# Patient Record
Sex: FEMALE | Race: OTHER | HISPANIC OR LATINO | Employment: UNEMPLOYED | URBAN - NONMETROPOLITAN AREA
[De-identification: names, ages, dates, MRNs, and addresses within clinical notes are randomized per-mention and may not be internally consistent; named-entity substitution may affect disease eponyms.]

---

## 2023-07-08 ENCOUNTER — HOSPITAL ENCOUNTER (EMERGENCY)
Facility: HOSPITAL | Age: 43
Discharge: HOME | End: 2023-07-08
Attending: EMERGENCY MEDICINE
Payer: COMMERCIAL

## 2023-07-08 ENCOUNTER — APPOINTMENT (EMERGENCY)
Dept: CT IMAGING | Facility: HOSPITAL | Age: 43
End: 2023-07-08
Payer: COMMERCIAL

## 2023-07-08 VITALS
OXYGEN SATURATION: 98 % | TEMPERATURE: 98.3 F | HEART RATE: 61 BPM | WEIGHT: 253.31 LBS | DIASTOLIC BLOOD PRESSURE: 60 MMHG | RESPIRATION RATE: 18 BRPM | SYSTOLIC BLOOD PRESSURE: 107 MMHG

## 2023-07-08 DIAGNOSIS — S09.90XA INJURY OF HEAD, INITIAL ENCOUNTER: Primary | ICD-10-CM

## 2023-07-08 DIAGNOSIS — S06.0XAA CONCUSSION: ICD-10-CM

## 2023-07-08 PROCEDURE — 70450 CT HEAD/BRAIN W/O DYE: CPT

## 2023-07-08 PROCEDURE — 99284 EMERGENCY DEPT VISIT MOD MDM: CPT

## 2023-07-08 PROCEDURE — 72125 CT NECK SPINE W/O DYE: CPT

## 2023-07-08 RX ORDER — BUPROPION HYDROCHLORIDE 300 MG/1
300 TABLET ORAL DAILY
COMMUNITY
Start: 2023-04-10

## 2023-07-08 RX ORDER — CLONAZEPAM 1 MG/1
1 TABLET ORAL DAILY
COMMUNITY
Start: 2023-05-30

## 2023-07-08 RX ORDER — QUETIAPINE FUMARATE 200 MG/1
250 TABLET, FILM COATED ORAL DAILY
COMMUNITY
Start: 2023-04-03

## 2023-07-08 RX ORDER — CLOPIDOGREL BISULFATE 75 MG/1
75 TABLET ORAL DAILY
COMMUNITY
Start: 2023-04-12

## 2023-07-08 RX ORDER — HYDROXYZINE 50 MG/1
TABLET, FILM COATED ORAL
COMMUNITY

## 2023-07-08 RX ORDER — LEVOTHYROXINE SODIUM 0.1 MG/1
100 TABLET ORAL DAILY
COMMUNITY
Start: 2023-03-21

## 2023-07-08 RX ORDER — ACETAMINOPHEN 325 MG/1
975 TABLET ORAL ONCE
Status: COMPLETED | OUTPATIENT
Start: 2023-07-08 | End: 2023-07-08

## 2023-07-08 RX ORDER — GABAPENTIN 300 MG/1
300 CAPSULE ORAL 3 TIMES DAILY
COMMUNITY
Start: 2023-04-13

## 2023-07-08 RX ORDER — ATORVASTATIN CALCIUM 40 MG/1
40 TABLET, FILM COATED ORAL DAILY
COMMUNITY

## 2023-07-08 RX ORDER — LISINOPRIL 10 MG/1
10 TABLET ORAL DAILY
COMMUNITY
Start: 2023-04-12

## 2023-07-08 RX ADMIN — ACETAMINOPHEN 975 MG: 325 TABLET, FILM COATED ORAL at 03:10

## 2023-07-08 NOTE — ED PROVIDER NOTES
Emergency Department Trauma Note  Josie Hacth 44 y.o. female MRN: [id number]  Unit/Bed#: QY69/QU11 Encounter: [id number]      Trauma Alert: Trauma Acuity: Trauma Evaluation  Model of Arrival:   via    Trauma Team: Current Providers  Attending Provider: Joe Nascimento MD  Registered Nurse: Juana Kelsey RN  Consultants:     None      History of Present Illness     Chief Complaint:   Chief Complaint   Patient presents with   • Head Injury     HPI:  Josie Hatch is a 44 y.o. female who presents with . Mechanism:Details of Incident: Patient reports striking head on bottom side of steps when standing up. Patient is on plavix and reports LOC at the time. Injury Date: 07/06/23        56-year-old female, on Plavix, who presents for head strike. Patient reports that 2 days ago, she hit her head on the bottom of the staircase. She states that she hit the top of her head, and lost consciousness afterwards. She reports since then having several episodes of nausea and vomiting. She reports blurry vision however only in her left eye and intermittent (not present currently). She denies any numbness, weakness, tingling. She does have a history of migraine headaches, states that she has a frontal headache that rates to her neck. Review of systems otherwise negative. Review of Systems   Constitutional: Negative for chills and fever. HENT: Negative for congestion, rhinorrhea and sore throat. Eyes: Positive for visual disturbance. Respiratory: Negative for cough and shortness of breath. Cardiovascular: Negative for chest pain and palpitations. Gastrointestinal: Positive for nausea and vomiting. Negative for abdominal pain, constipation and diarrhea. Genitourinary: Negative for difficulty urinating and flank pain. Musculoskeletal: Negative for arthralgias. Neurological: Positive for headaches. Negative for dizziness, weakness and light-headedness.    Psychiatric/Behavioral: Negative for agitation, behavioral problems and confusion. All other systems reviewed and are negative. Historical Information     Immunizations: There is no immunization history on file for this patient. Past Medical History:   Diagnosis Date   • Diabetes mellitus ([address])    • Disease of thyroid gland    • Hypercholesteremia    • Hypertension    • Stroke [facility])      History reviewed. No pertinent family history. Past Surgical History:   Procedure Laterality Date   •  SECTION     • CHOLECYSTECTOMY     • DILATION AND CURETTAGE OF UTERUS     • GASTRIC BYPASS     • TUBAL LIGATION       Social History     Tobacco Use   • Smoking status: Never   • Smokeless tobacco: Never   Vaping Use   • Vaping Use: Never used   Substance Use Topics   • Alcohol use: Never   • Drug use: Never     E-Cigarette/Vaping   • E-Cigarette Use Never User      E-Cigarette/Vaping Substances       Family History: non-contributory    Meds/Allergies   Prior to Admission Medications   Prescriptions Last Dose Informant Patient Reported? Taking?    QUEtiapine (SEROquel) 200 mg tablet   Yes Yes   Sig: Take 250 mg by mouth daily   atorvastatin (LIPITOR) 40 mg tablet   Yes Yes   Sig: Take 40 mg by mouth daily   buPROPion (WELLBUTRIN XL) 300 mg 24 hr tablet   Yes Yes   Sig: Take 300 mg by mouth daily   clonazePAM (KlonoPIN) 1 mg tablet   Yes Yes   Sig: Take 1 mg by mouth daily   clopidogrel (PLAVIX) 75 mg tablet   Yes Yes   Sig: Take 75 mg by mouth daily   gabapentin (NEURONTIN) 300 mg capsule   Yes Yes   Sig: Take 300 mg by mouth Three times a day   hydrOXYzine HCL (ATARAX) 50 mg tablet   Yes No   Sig: hydroxyzine HCl 50 mg tablet   TAKE 1 CAPSULE BY MOUTH EVERY DAY AT NIGHT AS NEEDED FOR INSOMNIA   levothyroxine 100 mcg tablet   Yes Yes   Sig: Take 100 mcg by mouth daily   lisinopril (ZESTRIL) 10 mg tablet   Yes Yes   Sig: Take 10 mg by mouth daily   metFORMIN (GLUCOPHAGE) 1000 MG tablet   Yes Yes   Sig: Take 500 mg by mouth 2 (two) times a day semaglutide, 1 mg/dose, (Ozempic, 1 MG/DOSE,) 4 mg/3 mL injection pen   Yes Yes   Sig: Inject 1 mg under the skin once a week      Facility-Administered Medications: None       Allergies   Allergen Reactions   • Aspirin Rash     rash       PHYSICAL EXAM    Objective   Vitals:   First set: Temperature: 98.3 °F (36.8 °C) (07/08/23 0247)  Pulse: 58 (07/08/23 0247)  Respirations: 20 (07/08/23 0247)  Blood Pressure: 119/79 (07/08/23 0247)  SpO2: 98 % (07/08/23 0247)    Primary Survey:   (A) Airway: Intact  (B) Breathing: Equal BL  (C) Circulation: Pulses:   Normal, 3/4 radial  (D) Disabliity:  GCS Total:  15  (E) Expose:  Completed    Secondary Survey: (Click on Physical Exam tab above)  Physical Exam  Constitutional:       Appearance: She is well-developed. HENT:      Head: Normocephalic and atraumatic. Right Ear: Tympanic membrane normal.      Left Ear: Tympanic membrane normal.      Nose: Nose normal.   Eyes:      Comments: EOM intact with normal tracking   Cardiovascular:      Rate and Rhythm: Normal rate and regular rhythm. Heart sounds: Normal heart sounds. No murmur heard. No friction rub. Pulmonary:      Effort: Pulmonary effort is normal. No respiratory distress. Breath sounds: Normal breath sounds. No wheezing or rales. Abdominal:      General: Bowel sounds are normal. There is no distension. Palpations: Abdomen is soft. Tenderness: There is no abdominal tenderness. Musculoskeletal:         General: Tenderness present. Normal range of motion. Cervical back: Normal range of motion and neck supple. Comments: Tenderness over paraspinal muscles of C-spine. No midline point tenderness   Skin:     General: Skin is warm. Neurological:      Mental Status: She is alert and oriented to person, place, and time. Coordination: Coordination normal.      Comments: Patient AAOx3. CN II-XII intact. Normal CFTs. 5/5 strength in all extremities.  Normal sensation in all extremities. Psychiatric:         Behavior: Behavior normal.         Thought Content: Thought content normal.         Judgment: Judgment normal.         Cervical spine cleared by clinical criteria? No    Invasive Devices     None                 Lab Results:   Results Reviewed     None                 Imaging Studies:   Direct to CT: Yes  TRAUMA - CT head wo contrast   Final Result by Timothy Temple MD (07/08 9277)      No acute intracranial abnormality. Workstation performed: [id number]         TRAUMA - CT spine cervical wo contrast   Final Result by Timothy Temple MD (07/08 0099)      No cervical spine fracture or traumatic malalignment. Workstation performed: [id number]               Procedures  Procedures         ED Course           Medical Decision Making  I reviewed the patient's medical chart, PMHx, prior encounters, medications. Chest x-ray not necessary given no chest trauma. My DDx includes: Traumatic ICH, concussion, C-spine injury, migraine headache    We will obtain CT head, C-spine. CT scans were negative. I suspect the patient has concussion, given her symptoms currently from recent head strike with negative CT scan. Patient was discharged strict return precautions. Amount and/or Complexity of Data Reviewed  Radiology: ordered. Risk  OTC drugs. Disposition  Priority One Transfer: No  Final diagnoses:   Injury of head, initial encounter   Concussion     Time reflects when diagnosis was documented in both MDM as applicable and the Disposition within this note     Time User Action Codes Description Comment    7/8/2023  3:26 AM Aviva Christianson [S09.90XA] Injury of head, initial encounter     7/8/2023  3:26 AM Meenakshi Christianson [facility] [S06. 0XAA] Concussion       ED Disposition     ED Disposition   Discharge    Condition   Stable    Date/Time   Sat Jul 8, 2023  3:26 AM    Comment   Jose Morales discharge to home/self care.               Follow-up Information     Follow up With Specialties Details Why Contact Info Additional Information    [address] [facility] Call  For re-evaluation [address] [address]  [address] [address] [facility],[place] [address] [facility] Call  For re-evaluation [address] [facility] [address] [facility] [address]  [address] [facility], [address] [place], [place], [address] [address]        Discharge Medication List as of 7/8/2023  3:27 AM      CONTINUE these medications which have NOT CHANGED    Details   atorvastatin (LIPITOR) 40 mg tablet Take 40 mg by mouth daily, Historical Med      buPROPion (WELLBUTRIN XL) 300 mg 24 hr tablet Take 300 mg by mouth daily, Starting Mon 4/10/2023, Historical Med      clonazePAM (KlonoPIN) 1 mg tablet Take 1 mg by mouth daily, Starting Tue 5/30/2023, Historical Med      clopidogrel (PLAVIX) 75 mg tablet Take 75 mg by mouth daily, Starting Wed 4/12/2023, Historical Med      gabapentin (NEURONTIN) 300 mg capsule Take 300 mg by mouth Three times a day, Starting Thu 4/13/2023, Historical Med      levothyroxine 100 mcg tablet Take 100 mcg by mouth daily, Starting Tue 3/21/2023, Historical Med      lisinopril (ZESTRIL) 10 mg tablet Take 10 mg by mouth daily, Starting Wed 4/12/2023, Historical Med      metFORMIN (GLUCOPHAGE) 1000 MG tablet Take 500 mg by mouth 2 (two) times a day, Starting Thu 4/13/2023, Historical Med      QUEtiapine (SEROquel) 200 mg tablet Take 250 mg by mouth daily, Starting Mon 4/3/2023, Historical Med      semaglutide, 1 mg/dose, (Ozempic, 1 MG/DOSE,) 4 mg/3 mL injection pen Inject 1 mg under the skin once a week, Starting Thu 5/11/2023, Historical Med      hydrOXYzine HCL (ATARAX) 50 mg tablet hydroxyzine HCl 50 mg tablet   TAKE 1 CAPSULE BY MOUTH EVERY DAY AT NIGHT AS NEEDED FOR INSOMNIA, Historical Med               PDMP Review     None          ED Provider  Electronically Signed by         Nohemi Tejada MD  07/08/23 2152

## 2023-07-18 ENCOUNTER — EVALUATION (OUTPATIENT)
Dept: PHYSICAL THERAPY | Facility: CLINIC | Age: 43
End: 2023-07-18
Payer: COMMERCIAL

## 2023-07-18 DIAGNOSIS — M54.2 CERVICALGIA: Primary | ICD-10-CM

## 2023-07-18 DIAGNOSIS — R51.9 CHRONIC DAILY HEADACHE: ICD-10-CM

## 2023-07-18 PROCEDURE — 97140 MANUAL THERAPY 1/> REGIONS: CPT | Performed by: PHYSICAL THERAPIST

## 2023-07-18 PROCEDURE — 97162 PT EVAL MOD COMPLEX 30 MIN: CPT | Performed by: PHYSICAL THERAPIST

## 2023-07-18 NOTE — PROGRESS NOTES
PT Evaluation     Today's date: 2023  Patient name: Kathi Ferguson  : 1980  MRN: [id number]  Referring provider: Trish Angelucci, PA-C  Dx:   Encounter Diagnosis     ICD-10-CM    1. Cervicalgia  M54.2       2. Chronic daily headache  R51.9           Start Time: 1000  Stop Time: 1100  Total time in clinic (min): 60 minutes    Assessment  Assessment details: Patient is a 44year old female that presents to outpatient clinic with neck pain and chief complaint of headaches. On initial assessment, patient's cervical spine posture in sitting was noted to be resting in a forward head. Patient tolerated the evaluation well. Patient presented with a headache at the start of the evaluation and the headache remained at the end of the evaluation but at a lower intensity. Patient had myotomes, dermatomes, and reflexes of the upper quarter assessed. Myotomes and dermatomes were WNL. UE reflexes were noted as 1+. Alar and transverse ligament integrity was assessed and both ligaments were WNL. No vertebral artery insuffiencey was found. AROM of the cervical spine showed minimal limitations in all motions except for bilateral lateral side bending. Bilateral lateral side bending was moderate limited with patient reporting the most pain when side bending to the R. Patient showed difficulty with cervical strength assessment. Patient had the most difficulty shirley the musculature to flex the cervical spine against resistance. Joint assessment of the cervical spine showed good rotatory motion at the atlanto axial joint. Hypomobility was noted at the occipital atlanto joint in all glide directions. Side glide assessment of the lower cervical spine was noted to be hypomobile except for C6 and C7 segments which were WNL. Hypertrophy was noted in the suboccipital musculature with tenderness on the L side of the suboccipitals. Patient responded well to joint mobilizations at the occipital atlanto joint.  Patient noted a decrease in headache intensity with distraction technique of the occiput. Patient was educated on good head posture, avoiding positions that irritate their neck, and how posterior neck tightness can be a possible cause of headaches. Patient was provided with an initial HEP to begin light strengthening and stretching of the cervical spine. Impairments: abnormal or restricted ROM, activity intolerance, lacks appropriate home exercise program, pain with function and poor posture     Symptom irritability: highUnderstanding of Dx/Px/POC: good   Prognosis: good    Goals  STGs  Patient will be independent with HEP in 2-3 visits. Patient will show a 25% decrease in headache symptoms in 3-4 weeks to be able to tolerate normal ADL's. Patient will show an increase in overall cervical musculature strength from a 3+/5 to a 4/5 in 3-4 weeks to be able to tolerate caring for their children. Patient will increase L and R cervical rotation from 54 degrees and 64 degrees,respectively to 60 and 70 degrees to increase tolerance to turn their head while driving. LTGs  Patient will increase FOTO score from 29 to 50 by discharge to be able to get back to normal activities and caring for their children. Patient will show a 75% decrease in headache symptoms by discharge to tolerate normal ADL's. Patient will show an increase in overall cervical musculature strength from a 3+/5 to WNL by discharge to tolerate normal activities without pain in their neck. Patient will increase L and R cervical rotation from 54 degrees and 64 degrees, respectively to WNL to be able to turn their head while driving without pain. Plan  Plan details: Patient will receive skilled physical therapy to address functional deficits found in today's evaluation. Patient will receive skilled intervention to target cervical spine mobility and strength.   Patient informed that from this point forward, to ensure adherence to the aforementioned plan of care, all or some of the treatment may be performed and carried out by a Physical Therapy Assistant (PTA) with supervision from a licensed Physical Therapist (PT) in accordance with [address] [place] [address]. From this point forward, all or some treatments may be carried out by a Physical Therapy Student (SPT) while under direct supervision from a licensed Physical Therapist (PT). Planned modality interventions: cryotherapy and thermotherapy: hydrocollator packs  Planned therapy interventions: neuromuscular re-education, joint mobilization, manual therapy, therapeutic exercise, home exercise program and flexibility  Frequency: 1x week  Duration in weeks: 6  Plan of Care beginning date: 7/18/2023  Plan of Care expiration date: 8/29/2023  Treatment plan discussed with: patient        Subjective Evaluation    History of Present Illness  Mechanism of injury: Patient is a 44year old female that presents to outpatient clinic with neck pain and chief complaint of headaches. Patient reports sitting on their outside steps and standing up and hitting her head on the ceiling which caused the head injury on 7/8. Patient reports using lidocaine patches on their neck and other medication for the headaches. Patient states they have had their headaches and neck pain since 2019 and they feel they began around the same time they had a stroke. Patient reports they feel the head injury made symptoms worse and more prominent in the past week. Patient reports a lot of fatigue since the head injury and feeling intermittent tingling in their hands and feet. Patient states tingling in her feet has been present before the head injury. Quality of life: good    Patient Goals  Patient goals for therapy: increased strength, decreased pain, independence with ADLs/IADLs and return to sport/leisure activities  Patient goal: I would like the headaches to go away so that I can function better on a normal basis.   Pain  Current pain rating: 10  At best pain ratin  At worst pain rating: 10  Quality: pressure and discomfort  Relieving factors: heat    Treatments  Previous treatment: physical therapy  Current treatment: medication        Objective     Neurological Testing     Sensation   Cervical/Thoracic   Left   Intact: light touch    Right   Intact: light touch    Reflexes   Left   Biceps (C5/C6): trace (1+)  Brachioradialis (C6): trace (1+)  Triceps (C7): trace (1+)  Hunt's reflex: negative    Right   Biceps (C5/C6): trace (1+)  Brachioradialis (C6): trace (1+)  Triceps (C7): trace (1+)  Hunt's reflex: negative    Active Range of Motion   Cervical/Thoracic Spine       Cervical    Flexion: 68 degrees  with pain  Extension: 72 degrees     with pain  Left lateral flexion: 27 degrees     with pain  Right lateral flexion: 12 degrees     with pain  Left rotation: 54 degrees with pain  Right rotation: 67 degrees    with pain    Joint Play   Joints within functional limits: C6 and C7     Hypomobile: C1, C2, C3, C4 and C5     Strength/Myotome Testing   Cervical Spine   Neck extension: 3+  Neck flexion: 3+    Left   Interossei strength (t1): 4+  Neck lateral flexion (C3): 3+  Levator scapulae (C4): 4    Right   Interossei strength (t1): 4+  Neck lateral flexion (C3): 3+  Levator scapulae (C4): 4    Left Shoulder     Planes of Motion   Abduction: 4     Isolated Muscles   Levator scapulae: 4     Right Shoulder     Planes of Motion   Abduction: 4     Isolated Muscles   Levator scapulae: 4     Left Elbow   Flexion: 4  Extension: 4    Right Elbow   Flexion: 4  Extension: 4    Left Wrist/Hand   Wrist extension: 4+  Wrist flexion: 4+  Thumb extension: 4+    Right Wrist/Hand   Wrist extension: 4+  Wrist flexion: 4+  Thumb extension: 4+    Tests   Cervical   Negative alar ligament test, transverse ligament test and VBI.        Flowsheet Rows    Flowsheet Row Most Recent Value   PT/OT G-Codes    Current Score 29   Projected Score 50             Precautions: Hx of stroke in 2019    Date 7/17 IE       FOTO ZJ 29       Manuals        SOR 30" 5x       Grade 4 OA joint mob ZJ 30" 5x                       Neuro Re-Ed                                                                Ther Ex        Cervical isometrics 10" 10x ea       Lateral side bend stretch 10" 10x ea                                                       Ther Activity                        Gait Training                        Modalities

## 2023-07-18 NOTE — LETTER
2023    Bozena Champagne PA-C  [address] [facility] [address]    Patient: Milena Ross   YOB: 1980   Date of Visit: 2023     Encounter Diagnosis     ICD-10-CM    1. Cervicalgia  M54.2       2. Chronic daily headache  R51.9           Dear Dr. Amadou Masters: Thank you for your recent referral of Milena Ross. Please review the attached evaluation summary from Aidy's recent visit. Please verify that you agree with the plan of care by signing the attached order. If you have any questions or concerns, please do not hesitate to call. I sincerely appreciate the opportunity to share in the care of one of your patients and hope to have another opportunity to work with you in the near future. Sincerely,    Sandro Sanchez      Referring Provider:      I certify that I have read the below Plan of Care and certify the need for these services furnished under this plan of treatment while under my care. Bozena Champagne PA-C  [address] [facility] [address]  Via Fax: [phone]          PT Evaluation     Today's date: 2023  Patient name: Milena Ross  : 1980  MRN: [id number]  Referring provider: Bozena Champagne PA-C  Dx:   Encounter Diagnosis     ICD-10-CM    1. Cervicalgia  M54.2       2. Chronic daily headache  R51.9           Start Time: 1000  Stop Time: 1100  Total time in clinic (min): 60 minutes    Assessment  Assessment details: Patient is a 44year old female that presents to outpatient clinic with neck pain and chief complaint of headaches. On initial assessment, patient's cervical spine posture in sitting was noted to be resting in a forward head. Patient tolerated the evaluation well. Patient presented with a headache at the start of the evaluation and the headache remained at the end of the evaluation but at a lower intensity. Patient had myotomes, dermatomes, and reflexes of the upper quarter assessed.  Myotomes and dermatomes were WNL. UE reflexes were noted as 1+. Alar and transverse ligament integrity was assessed and both ligaments were WNL. No vertebral artery insuffiencey was found. AROM of the cervical spine showed minimal limitations in all motions except for bilateral lateral side bending. Bilateral lateral side bending was moderate limited with patient reporting the most pain when side bending to the R. Patient showed difficulty with cervical strength assessment. Patient had the most difficulty shirley the musculature to flex the cervical spine against resistance. Joint assessment of the cervical spine showed good rotatory motion at the atlanto axial joint. Hypomobility was noted at the occipital atlanto joint in all glide directions. Side glide assessment of the lower cervical spine was noted to be hypomobile except for C6 and C7 segments which were WNL. Hypertrophy was noted in the suboccipital musculature with tenderness on the L side of the suboccipitals. Patient responded well to joint mobilizations at the occipital atlanto joint. Patient noted a decrease in headache intensity with distraction technique of the occiput. Patient was educated on good head posture, avoiding positions that irritate their neck, and how posterior neck tightness can be a possible cause of headaches. Patient was provided with an initial HEP to begin light strengthening and stretching of the cervical spine. Impairments: abnormal or restricted ROM, activity intolerance, lacks appropriate home exercise program, pain with function and poor posture     Symptom irritability: highUnderstanding of Dx/Px/POC: good   Prognosis: good    Goals  STGs  Patient will be independent with HEP in 2-3 visits. Patient will show a 25% decrease in headache symptoms in 3-4 weeks to be able to tolerate normal ADL's.   Patient will show an increase in overall cervical musculature strength from a 3+/5 to a 4/5 in 3-4 weeks to be able to tolerate caring for their children. Patient will increase L and R cervical rotation from 54 degrees and 64 degrees,respectively to 60 and 70 degrees to increase tolerance to turn their head while driving. LTGs  Patient will increase FOTO score from 29 to 50 by discharge to be able to get back to normal activities and caring for their children. Patient will show a 75% decrease in headache symptoms by discharge to tolerate normal ADL's. Patient will show an increase in overall cervical musculature strength from a 3+/5 to WNL by discharge to tolerate normal activities without pain in their neck. Patient will increase L and R cervical rotation from 54 degrees and 64 degrees, respectively to WNL to be able to turn their head while driving without pain. Plan  Plan details: Patient will receive skilled physical therapy to address functional deficits found in today's evaluation. Patient will receive skilled intervention to target cervical spine mobility and strength. Patient informed that from this point forward, to ensure adherence to the aforementioned plan of care, all or some of the treatment may be performed and carried out by a Physical Therapy Assistant (PTA) with supervision from a licensed Physical Therapist (PT) in accordance with [address] [place] [address]. From this point forward, all or some treatments may be carried out by a Physical Therapy Student (SPT) while under direct supervision from a licensed Physical Therapist (PT).   Planned modality interventions: cryotherapy and thermotherapy: hydrocollator packs  Planned therapy interventions: neuromuscular re-education, joint mobilization, manual therapy, therapeutic exercise, home exercise program and flexibility  Frequency: 1x week  Duration in weeks: 6  Plan of Care beginning date: 7/18/2023  Plan of Care expiration date: 8/29/2023  Treatment plan discussed with: patient        Subjective Evaluation    History of Present Illness  Mechanism of injury: Patient is a 44year old female that presents to outpatient clinic with neck pain and chief complaint of headaches. Patient reports sitting on their outside steps and standing up and hitting her head on the ceiling which caused the head injury on . Patient reports using lidocaine patches on their neck and other medication for the headaches. Patient states they have had their headaches and neck pain since 2019 and they feel they began around the same time they had a stroke. Patient reports they feel the head injury made symptoms worse and more prominent in the past week. Patient reports a lot of fatigue since the head injury and feeling intermittent tingling in their hands and feet. Patient states tingling in her feet has been present before the head injury. Quality of life: good    Patient Goals  Patient goals for therapy: increased strength, decreased pain, independence with ADLs/IADLs and return to sport/leisure activities  Patient goal: I would like the headaches to go away so that I can function better on a normal basis.   Pain  Current pain rating: 10  At best pain ratin  At worst pain rating: 10  Quality: pressure and discomfort  Relieving factors: heat    Treatments  Previous treatment: physical therapy  Current treatment: medication        Objective     Neurological Testing     Sensation   Cervical/Thoracic   Left   Intact: light touch    Right   Intact: light touch    Reflexes   Left   Biceps (C5/C6): trace (1+)  Brachioradialis (C6): trace (1+)  Triceps (C7): trace (1+)  Hunt's reflex: negative    Right   Biceps (C5/C6): trace (1+)  Brachioradialis (C6): trace (1+)  Triceps (C7): trace (1+)  Hunt's reflex: negative    Active Range of Motion   Cervical/Thoracic Spine       Cervical    Flexion: 68 degrees  with pain  Extension: 72 degrees     with pain  Left lateral flexion: 27 degrees     with pain  Right lateral flexion: 12 degrees     with pain  Left rotation: 54 degrees with pain  Right rotation: 67 degrees    with pain    Joint Play   Joints within functional limits: C6 and C7     Hypomobile: C1, C2, C3, C4 and C5     Strength/Myotome Testing   Cervical Spine   Neck extension: 3+  Neck flexion: 3+    Left   Interossei strength (t1): 4+  Neck lateral flexion (C3): 3+  Levator scapulae (C4): 4    Right   Interossei strength (t1): 4+  Neck lateral flexion (C3): 3+  Levator scapulae (C4): 4    Left Shoulder     Planes of Motion   Abduction: 4     Isolated Muscles   Levator scapulae: 4     Right Shoulder     Planes of Motion   Abduction: 4     Isolated Muscles   Levator scapulae: 4     Left Elbow   Flexion: 4  Extension: 4    Right Elbow   Flexion: 4  Extension: 4    Left Wrist/Hand   Wrist extension: 4+  Wrist flexion: 4+  Thumb extension: 4+    Right Wrist/Hand   Wrist extension: 4+  Wrist flexion: 4+  Thumb extension: 4+    Tests   Cervical   Negative alar ligament test, transverse ligament test and VBI.        Flowsheet Rows    Flowsheet Row Most Recent Value   PT/OT G-Codes    Current Score 29   Projected Score 50            Precautions: Hx of stroke in 2019    Date 7/17 IE       FOTO ZJ 29       Manuals        SOR 30" 5x       Grade 4 OA joint mob ZJ 30" 5x                       Neuro Re-Ed                                                                Ther Ex        Cervical isometrics 10" 10x ea       Lateral side bend stretch 10" 10x ea                                                       Ther Activity                        Gait Training                        Modalities

## 2023-07-20 ENCOUNTER — OFFICE VISIT (OUTPATIENT)
Dept: PHYSICAL THERAPY | Facility: CLINIC | Age: 43
End: 2023-07-20
Payer: COMMERCIAL

## 2023-07-20 DIAGNOSIS — M54.2 CERVICALGIA: Primary | ICD-10-CM

## 2023-07-20 DIAGNOSIS — R51.9 CHRONIC DAILY HEADACHE: ICD-10-CM

## 2023-07-20 PROCEDURE — 97140 MANUAL THERAPY 1/> REGIONS: CPT

## 2023-07-20 PROCEDURE — 97110 THERAPEUTIC EXERCISES: CPT

## 2023-07-20 NOTE — PROGRESS NOTES
Daily Note     Today's date: 2023  Patient name: Jaiver Hagan  : 1980  MRN: [id number]  Referring provider: Tanvi Torres PA-C  Dx:   Encounter Diagnosis     ICD-10-CM    1. Cervicalgia  M54.2       2. Chronic daily headache  R51.9           Start Time: 1600  Stop Time: 1645  Total time in clinic (min): 45 minutes    Subjective: Pt continues with HA , B/L UT tightness. She does comment on L eye blurriness. Objective: See treatment diary below      Assessment: Tolerated treatment fair. Patient would benefit from continued PT. Pt requires frequent VC'S for proper performance of her isometrics. PTA recommended touching base with the family MD regarding the L eye sx. Plan: Continue per plan of care.       Precautions: Hx of stroke in 2019    Date  IE       FOTO ZJ 29       Manuals        SOR 30" 5x ds      Grade 4 OA joint mob ZJ 30" 5x np                      Neuro Re-Ed                                                Ther Ex        Cervical isometrics 10" 10x ea 10x 10" ea (all)      Lateral side bend stretch 10" 10x ea 10x 10"ea                                       Ther Activity                        Gait Training                Modalities        HP/CP  Hp B/L UT

## 2023-07-24 ENCOUNTER — OFFICE VISIT (OUTPATIENT)
Dept: PHYSICAL THERAPY | Facility: CLINIC | Age: 43
End: 2023-07-24
Payer: COMMERCIAL

## 2023-07-24 DIAGNOSIS — M54.2 CERVICALGIA: Primary | ICD-10-CM

## 2023-07-24 DIAGNOSIS — R51.9 CHRONIC DAILY HEADACHE: ICD-10-CM

## 2023-07-24 PROCEDURE — 97140 MANUAL THERAPY 1/> REGIONS: CPT

## 2023-07-24 PROCEDURE — 97110 THERAPEUTIC EXERCISES: CPT

## 2023-07-24 NOTE — PROGRESS NOTES
Daily Note     Today's date: 2023  Patient name: Rosetta Billy  : 1980  MRN: [id number]  Referring provider: Yehuda Gilman PA-C  Dx:   Encounter Diagnosis     ICD-10-CM    1. Cervicalgia  M54.2       2. Chronic daily headache  R51.9           Start Time: 1000  Stop Time: 1045  Total time in clinic (min): 45 minutes    Subjective: Pt states she had some relief of her HA after tx last visit. She continues with HA again today. MRI today at 4PM.      Objective: See treatment diary below      Assessment: Tolerated treatment fair. Patient would benefit from continued PT      Plan: Continue per plan of care.       Precautions: Hx of stroke in 2019    Date  IE      FOTO ZJ 29       Manuals        SOR 30" 5x Ds+tpr Ds-TPR  R UT     Grade 4 OA joint mob ZJ 30" 5x np np                     Neuro Re-Ed                                        Ther Ex        Cervical isometrics 10" 10x ea 10x 10" ea (all) 10x 10" -all     Lateral side bend stretch 10" 10x ea 10x 10"ea  10x 10"                                     Ther Activity                        Gait Training                Modalities        HP/CP  Hp B/L UT HP B/L UT

## 2023-07-27 ENCOUNTER — APPOINTMENT (OUTPATIENT)
Dept: PHYSICAL THERAPY | Facility: CLINIC | Age: 43
End: 2023-07-27
Payer: COMMERCIAL

## 2023-07-31 ENCOUNTER — OFFICE VISIT (OUTPATIENT)
Dept: PHYSICAL THERAPY | Facility: CLINIC | Age: 43
End: 2023-07-31
Payer: COMMERCIAL

## 2023-07-31 DIAGNOSIS — M54.2 CERVICALGIA: Primary | ICD-10-CM

## 2023-07-31 DIAGNOSIS — R51.9 CHRONIC DAILY HEADACHE: ICD-10-CM

## 2023-07-31 PROCEDURE — 97140 MANUAL THERAPY 1/> REGIONS: CPT

## 2023-07-31 PROCEDURE — 97110 THERAPEUTIC EXERCISES: CPT

## 2023-07-31 NOTE — PROGRESS NOTES
Daily Note     Today's date: 2023  Patient name: Milena Ross  : 1980  MRN: [id number]  Referring provider: Bozena Champagne PA-C  Dx:   Encounter Diagnosis     ICD-10-CM    1. Cervicalgia  M54.2       2. Chronic daily headache  R51.9           Start Time: 1415  Stop Time: 1500  Total time in clinic (min): 45 minutes    Subjective: I am having more pain today in the right side off my neck. and into the back of my right shoulder and shoulder blade. I wake up at night from the pain,. Objective: See treatment diary below      Assessment: Tolerated treatment well. Patient would benefit from continued PT  Pt reports having increased numbness in her right arm during therapy today. She did state that after therapy her numbness wasn't as bad. . she had trigger points and tightness mostly in her right upper trap and posterior right shoulder. She was tender in her neck as well. We ended with a hot pack to her neck in supine for 10 min. Plan: Continue per plan of care.       Precautions: Hx of stroke in 2019    Date  IE     FOTO ZJ 29       Manuals        SOR 30" 5x Ds+tpr Ds-TPR  R UT 5 min SOR  10 min STM to neck and traps    Grade 4 OA joint mob ZJ 30" 5x np np                     Neuro Re-Ed                                        Ther Ex        Cervical isometrics 10" 10x ea 10x 10" ea (all) 10x 10" -all     Lateral side bend stretch 10" 10x ea 10x 10"ea  10x 10"                                     Ther Activity                        Gait Training                Modalities        HP/CP  Hp B/L UT HP B/L UT

## 2023-09-17 ENCOUNTER — HOSPITAL ENCOUNTER (EMERGENCY)
Facility: HOSPITAL | Age: 43
Discharge: HOME | End: 2023-09-18
Attending: EMERGENCY MEDICINE | Admitting: EMERGENCY MEDICINE
Payer: COMMERCIAL

## 2023-09-17 VITALS
TEMPERATURE: 97 F | OXYGEN SATURATION: 99 % | DIASTOLIC BLOOD PRESSURE: 66 MMHG | SYSTOLIC BLOOD PRESSURE: 123 MMHG | HEIGHT: 71 IN | HEART RATE: 65 BPM | BODY MASS INDEX: 35.33 KG/M2 | RESPIRATION RATE: 18 BRPM

## 2023-09-17 DIAGNOSIS — S06.0XAA CONCUSSION: Primary | ICD-10-CM

## 2023-09-17 PROCEDURE — 99284 EMERGENCY DEPT VISIT MOD MDM: CPT

## 2023-09-18 ENCOUNTER — APPOINTMENT (EMERGENCY)
Dept: CT IMAGING | Facility: HOSPITAL | Age: 43
End: 2023-09-18
Payer: COMMERCIAL

## 2023-09-18 LAB
ANION GAP SERPL CALCULATED.3IONS-SCNC: 12 MMOL/L
BASOPHILS # BLD AUTO: 0.08 THOUSANDS/ÂΜL (ref 0–0.1)
BASOPHILS NFR BLD AUTO: 1 % (ref 0–1)
BUN SERPL-MCNC: 14 MG/DL (ref 5–25)
CALCIUM SERPL-MCNC: 9 MG/DL (ref 8.4–10.2)
CHLORIDE SERPL-SCNC: 110 MMOL/L (ref 96–108)
CO2 SERPL-SCNC: 17 MMOL/L (ref 21–32)
CREAT SERPL-MCNC: 0.8 MG/DL (ref 0.6–1.3)
EOSINOPHIL # BLD AUTO: 0.23 THOUSAND/ÂΜL (ref 0–0.61)
EOSINOPHIL NFR BLD AUTO: 2 % (ref 0–6)
ERYTHROCYTE [DISTWIDTH] IN BLOOD BY AUTOMATED COUNT: 14.8 % (ref 11.6–15.1)
GFR SERPL CREATININE-BSD FRML MDRD: 91 ML/MIN/1.73SQ M
GLUCOSE SERPL-MCNC: 84 MG/DL (ref 65–140)
HCT VFR BLD AUTO: 41.4 % (ref 34.8–46.1)
HGB BLD-MCNC: 13.3 G/DL (ref 11.5–15.4)
IMM GRANULOCYTES # BLD AUTO: 0.03 THOUSAND/UL (ref 0–0.2)
IMM GRANULOCYTES NFR BLD AUTO: 0 % (ref 0–2)
LYMPHOCYTES # BLD AUTO: 3.55 THOUSANDS/ÂΜL (ref 0.6–4.47)
LYMPHOCYTES NFR BLD AUTO: 29 % (ref 14–44)
MAGNESIUM SERPL-MCNC: 1.9 MG/DL (ref 1.9–2.7)
MCH RBC QN AUTO: 27.5 PG (ref 26.8–34.3)
MCHC RBC AUTO-ENTMCNC: 32.1 G/DL (ref 31.4–37.4)
MCV RBC AUTO: 86 FL (ref 82–98)
MONOCYTES # BLD AUTO: 0.93 THOUSAND/ÂΜL (ref 0.17–1.22)
MONOCYTES NFR BLD AUTO: 8 % (ref 4–12)
NEUTROPHILS # BLD AUTO: 7.26 THOUSANDS/ÂΜL (ref 1.85–7.62)
NEUTS SEG NFR BLD AUTO: 60 % (ref 43–75)
NRBC BLD AUTO-RTO: 0 /100 WBCS
PLATELET # BLD AUTO: 400 THOUSANDS/UL (ref 149–390)
PMV BLD AUTO: 10.2 FL (ref 8.9–12.7)
POTASSIUM SERPL-SCNC: 3.6 MMOL/L (ref 3.5–5.3)
RBC # BLD AUTO: 4.84 MILLION/UL (ref 3.81–5.12)
SODIUM SERPL-SCNC: 139 MMOL/L (ref 135–147)
WBC # BLD AUTO: 12.08 THOUSAND/UL (ref 4.31–10.16)

## 2023-09-18 PROCEDURE — 36415 COLL VENOUS BLD VENIPUNCTURE: CPT | Performed by: EMERGENCY MEDICINE

## 2023-09-18 PROCEDURE — 83735 ASSAY OF MAGNESIUM: CPT | Performed by: EMERGENCY MEDICINE

## 2023-09-18 PROCEDURE — 85025 COMPLETE CBC W/AUTO DIFF WBC: CPT | Performed by: EMERGENCY MEDICINE

## 2023-09-18 PROCEDURE — 80048 BASIC METABOLIC PNL TOTAL CA: CPT | Performed by: EMERGENCY MEDICINE

## 2023-09-18 PROCEDURE — 70498 CT ANGIOGRAPHY NECK: CPT

## 2023-09-18 PROCEDURE — G1004 CDSM NDSC: HCPCS

## 2023-09-18 PROCEDURE — 99285 EMERGENCY DEPT VISIT HI MDM: CPT | Performed by: EMERGENCY MEDICINE

## 2023-09-18 PROCEDURE — 70496 CT ANGIOGRAPHY HEAD: CPT

## 2023-09-18 PROCEDURE — 96374 THER/PROPH/DIAG INJ IV PUSH: CPT

## 2023-09-18 RX ORDER — DROPERIDOL 2.5 MG/ML
0.62 INJECTION, SOLUTION INTRAMUSCULAR; INTRAVENOUS ONCE
Status: COMPLETED | OUTPATIENT
Start: 2023-09-18 | End: 2023-09-18

## 2023-09-18 RX ORDER — ACETAMINOPHEN 500 MG
500 TABLET ORAL EVERY 6 HOURS PRN
Qty: 30 TABLET | Refills: 0 | Status: SHIPPED | OUTPATIENT
Start: 2023-09-18

## 2023-09-18 RX ADMIN — IOHEXOL 85 ML: 350 INJECTION, SOLUTION INTRAVENOUS at 01:52

## 2023-09-18 RX ADMIN — DROPERIDOL 0.62 MG: 2.5 INJECTION, SOLUTION INTRAMUSCULAR; INTRAVENOUS at 00:12

## 2023-09-18 NOTE — ED NOTES
Patient refusing to do the visual acuity screening at this time. Patient said she would try later.       Familia Solis  09/18/23 0014

## 2023-09-18 NOTE — DISCHARGE INSTRUCTIONS
At this time, we suspect you have a concussion. Please follow AfterCare instructions provided. A referral was sent for our concussion clinic for you to follow up. We also recommend you schedule follow up with your neurologist.    Follow all return precautions. Thank you.
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
## 2023-11-03 ENCOUNTER — HOSPITAL ENCOUNTER (EMERGENCY)
Facility: HOSPITAL | Age: 43
Discharge: HOME | End: 2023-11-04
Attending: EMERGENCY MEDICINE | Admitting: EMERGENCY MEDICINE
Payer: COMMERCIAL

## 2023-11-03 DIAGNOSIS — Z00.8 ENCOUNTER FOR PSYCHOLOGICAL EVALUATION: Primary | ICD-10-CM

## 2023-11-03 PROCEDURE — 99284 EMERGENCY DEPT VISIT MOD MDM: CPT

## 2023-11-04 ENCOUNTER — APPOINTMENT (EMERGENCY)
Dept: RADIOLOGY | Facility: HOSPITAL | Age: 43
End: 2023-11-04
Payer: COMMERCIAL

## 2023-11-04 VITALS
WEIGHT: 209.66 LBS | RESPIRATION RATE: 16 BRPM | TEMPERATURE: 97.8 F | SYSTOLIC BLOOD PRESSURE: 151 MMHG | BODY MASS INDEX: 29.24 KG/M2 | OXYGEN SATURATION: 96 % | HEART RATE: 91 BPM | DIASTOLIC BLOOD PRESSURE: 78 MMHG

## 2023-11-04 LAB
AMPHETAMINES SERPL QL SCN: NEGATIVE
BARBITURATES UR QL: NEGATIVE
BENZODIAZ UR QL: POSITIVE
COCAINE UR QL: NEGATIVE
ETHANOL EXG-MCNC: 0 MG/DL
EXT PREGNANCY TEST URINE: NEGATIVE
EXT. CONTROL: NORMAL
METHADONE UR QL: NEGATIVE
OPIATES UR QL SCN: NEGATIVE
OXYCODONE+OXYMORPHONE UR QL SCN: NEGATIVE
PCP UR QL: NEGATIVE
THC UR QL: NEGATIVE

## 2023-11-04 PROCEDURE — 80307 DRUG TEST PRSMV CHEM ANLYZR: CPT | Performed by: EMERGENCY MEDICINE

## 2023-11-04 PROCEDURE — 81025 URINE PREGNANCY TEST: CPT | Performed by: EMERGENCY MEDICINE

## 2023-11-04 PROCEDURE — 99285 EMERGENCY DEPT VISIT HI MDM: CPT | Performed by: EMERGENCY MEDICINE

## 2023-11-04 PROCEDURE — 71101 X-RAY EXAM UNILAT RIBS/CHEST: CPT

## 2023-11-04 PROCEDURE — 82075 ASSAY OF BREATH ETHANOL: CPT | Performed by: EMERGENCY MEDICINE
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
## 2023-11-04 NOTE — ED NOTES
Patient stated that she does not want anyone at the bedside. Patient's requested was expressed to family. Family began making a scene in the hallway. Provider informed family of patient's request at this time. Security called to escort family to waiting room.       Yareli Lucio RN  11/04/23 2392

## 2024-11-01 ENCOUNTER — APPOINTMENT (EMERGENCY)
Dept: RADIOLOGY | Facility: HOSPITAL | Age: 44
End: 2024-11-01
Payer: COMMERCIAL

## 2024-11-01 ENCOUNTER — HOSPITAL ENCOUNTER (EMERGENCY)
Facility: HOSPITAL | Age: 44
Discharge: HOME | End: 2024-11-01
Attending: EMERGENCY MEDICINE
Payer: COMMERCIAL

## 2024-11-01 VITALS
HEART RATE: 71 BPM | TEMPERATURE: 97.6 F | SYSTOLIC BLOOD PRESSURE: 117 MMHG | RESPIRATION RATE: 19 BRPM | DIASTOLIC BLOOD PRESSURE: 75 MMHG | OXYGEN SATURATION: 100 %

## 2024-11-01 DIAGNOSIS — E83.42 HYPOMAGNESEMIA: ICD-10-CM

## 2024-11-01 DIAGNOSIS — I95.9 HYPOTENSION, UNSPECIFIED HYPOTENSION TYPE: ICD-10-CM

## 2024-11-01 DIAGNOSIS — R07.2 RETROSTERNAL CHEST PAIN: Primary | ICD-10-CM

## 2024-11-01 LAB
ANION GAP SERPL CALCULATED.3IONS-SCNC: 8 MMOL/L (ref 4–13)
ATRIAL RATE: 77 BPM
BASOPHILS # BLD AUTO: 0.06 THOUSANDS/ΜL (ref 0–0.1)
BASOPHILS NFR BLD AUTO: 1 % (ref 0–1)
BNP SERPL-MCNC: 11 PG/ML (ref 0–100)
BUN SERPL-MCNC: 15 MG/DL (ref 5–25)
CALCIUM SERPL-MCNC: 8.8 MG/DL (ref 8.4–10.2)
CARDIAC TROPONIN I PNL SERPL HS: <2 NG/L
CARDIAC TROPONIN I PNL SERPL HS: <2 NG/L
CHLORIDE SERPL-SCNC: 108 MMOL/L (ref 96–108)
CO2 SERPL-SCNC: 24 MMOL/L (ref 21–32)
CREAT SERPL-MCNC: 0.84 MG/DL (ref 0.6–1.3)
D DIMER PPP FEU-MCNC: 0.34 UG/ML FEU
EOSINOPHIL # BLD AUTO: 0.08 THOUSAND/ΜL (ref 0–0.61)
EOSINOPHIL NFR BLD AUTO: 1 % (ref 0–6)
ERYTHROCYTE [DISTWIDTH] IN BLOOD BY AUTOMATED COUNT: 13 % (ref 11.6–15.1)
GFR SERPL CREATININE-BSD FRML MDRD: 85 ML/MIN/1.73SQ M
GLUCOSE SERPL-MCNC: 114 MG/DL (ref 65–140)
HCG SERPL QL: NEGATIVE
HCT VFR BLD AUTO: 38 % (ref 34.8–46.1)
HGB BLD-MCNC: 12.1 G/DL (ref 11.5–15.4)
IMM GRANULOCYTES # BLD AUTO: 0.02 THOUSAND/UL (ref 0–0.2)
IMM GRANULOCYTES NFR BLD AUTO: 0 % (ref 0–2)
LYMPHOCYTES # BLD AUTO: 3.13 THOUSANDS/ΜL (ref 0.6–4.47)
LYMPHOCYTES NFR BLD AUTO: 42 % (ref 14–44)
MAGNESIUM SERPL-MCNC: 1.8 MG/DL (ref 1.9–2.7)
MCH RBC QN AUTO: 28.8 PG (ref 26.8–34.3)
MCHC RBC AUTO-ENTMCNC: 31.8 G/DL (ref 31.4–37.4)
MCV RBC AUTO: 91 FL (ref 82–98)
MONOCYTES # BLD AUTO: 0.39 THOUSAND/ΜL (ref 0.17–1.22)
MONOCYTES NFR BLD AUTO: 5 % (ref 4–12)
NEUTROPHILS # BLD AUTO: 3.74 THOUSANDS/ΜL (ref 1.85–7.62)
NEUTS SEG NFR BLD AUTO: 51 % (ref 43–75)
NRBC BLD AUTO-RTO: 0 /100 WBCS
P AXIS: 76 DEGREES
PLATELET # BLD AUTO: 356 THOUSANDS/UL (ref 149–390)
PMV BLD AUTO: 10.4 FL (ref 8.9–12.7)
POTASSIUM SERPL-SCNC: 3.7 MMOL/L (ref 3.5–5.3)
PR INTERVAL: 168 MS
QRS AXIS: 49 DEGREES
QRSD INTERVAL: 92 MS
QT INTERVAL: 370 MS
QTC INTERVAL: 418 MS
RBC # BLD AUTO: 4.2 MILLION/UL (ref 3.81–5.12)
SODIUM SERPL-SCNC: 140 MMOL/L (ref 135–147)
T WAVE AXIS: 64 DEGREES
VENTRICULAR RATE: 77 BPM
WBC # BLD AUTO: 7.42 THOUSAND/UL (ref 4.31–10.16)

## 2024-11-01 PROCEDURE — 80048 BASIC METABOLIC PNL TOTAL CA: CPT | Performed by: EMERGENCY MEDICINE

## 2024-11-01 PROCEDURE — 84484 ASSAY OF TROPONIN QUANT: CPT | Performed by: EMERGENCY MEDICINE

## 2024-11-01 PROCEDURE — 36415 COLL VENOUS BLD VENIPUNCTURE: CPT | Performed by: EMERGENCY MEDICINE

## 2024-11-01 PROCEDURE — 83880 ASSAY OF NATRIURETIC PEPTIDE: CPT | Performed by: EMERGENCY MEDICINE

## 2024-11-01 PROCEDURE — 85379 FIBRIN DEGRADATION QUANT: CPT | Performed by: EMERGENCY MEDICINE

## 2024-11-01 PROCEDURE — 84703 CHORIONIC GONADOTROPIN ASSAY: CPT | Performed by: EMERGENCY MEDICINE

## 2024-11-01 PROCEDURE — 83735 ASSAY OF MAGNESIUM: CPT | Performed by: EMERGENCY MEDICINE

## 2024-11-01 PROCEDURE — 96365 THER/PROPH/DIAG IV INF INIT: CPT

## 2024-11-01 PROCEDURE — 93005 ELECTROCARDIOGRAM TRACING: CPT

## 2024-11-01 PROCEDURE — 71045 X-RAY EXAM CHEST 1 VIEW: CPT

## 2024-11-01 PROCEDURE — 96366 THER/PROPH/DIAG IV INF ADDON: CPT

## 2024-11-01 PROCEDURE — 85025 COMPLETE CBC W/AUTO DIFF WBC: CPT | Performed by: EMERGENCY MEDICINE

## 2024-11-01 PROCEDURE — 99285 EMERGENCY DEPT VISIT HI MDM: CPT

## 2024-11-01 PROCEDURE — 99285 EMERGENCY DEPT VISIT HI MDM: CPT | Performed by: EMERGENCY MEDICINE

## 2024-11-01 PROCEDURE — 96367 TX/PROPH/DG ADDL SEQ IV INF: CPT

## 2024-11-01 RX ORDER — SODIUM CHLORIDE 9 MG/ML
3 INJECTION INTRAVENOUS
Status: DISCONTINUED | OUTPATIENT
Start: 2024-11-01 | End: 2024-11-01 | Stop reason: HOSPADM

## 2024-11-01 RX ORDER — SODIUM CHLORIDE, SODIUM GLUCONATE, SODIUM ACETATE, POTASSIUM CHLORIDE, MAGNESIUM CHLORIDE, SODIUM PHOSPHATE, DIBASIC, AND POTASSIUM PHOSPHATE .53; .5; .37; .037; .03; .012; .00082 G/100ML; G/100ML; G/100ML; G/100ML; G/100ML; G/100ML; G/100ML
1000 INJECTION, SOLUTION INTRAVENOUS ONCE
Status: COMPLETED | OUTPATIENT
Start: 2024-11-01 | End: 2024-11-01

## 2024-11-01 RX ORDER — MAGNESIUM SULFATE HEPTAHYDRATE 40 MG/ML
2 INJECTION, SOLUTION INTRAVENOUS ONCE
Status: COMPLETED | OUTPATIENT
Start: 2024-11-01 | End: 2024-11-01

## 2024-11-01 RX ADMIN — SODIUM CHLORIDE, SODIUM GLUCONATE, SODIUM ACETATE, POTASSIUM CHLORIDE, MAGNESIUM CHLORIDE, SODIUM PHOSPHATE, DIBASIC, AND POTASSIUM PHOSPHATE 1000 ML: .53; .5; .37; .037; .03; .012; .00082 INJECTION, SOLUTION INTRAVENOUS at 13:11

## 2024-11-01 RX ADMIN — SODIUM CHLORIDE, SODIUM GLUCONATE, SODIUM ACETATE, POTASSIUM CHLORIDE, MAGNESIUM CHLORIDE, SODIUM PHOSPHATE, DIBASIC, AND POTASSIUM PHOSPHATE 1000 ML: .53; .5; .37; .037; .03; .012; .00082 INJECTION, SOLUTION INTRAVENOUS at 15:08

## 2024-11-01 RX ADMIN — MAGNESIUM SULFATE HEPTAHYDRATE 2 G: 40 INJECTION, SOLUTION INTRAVENOUS at 13:41

## 2024-11-01 NOTE — DISCHARGE INSTRUCTIONS
Please continue all your medications at current dosages and frequencies.    You can consider taking omeprazole regularly for the next 1-2 weeks to see if that makes any difference to symptoms. If so, the pain may be due to a GI cause (stomach ulcers, acid reflux, etc.).    You can also eat a higher salt diet for the next several weeks, as this will help elevate your blood pressure.    Please keep whatever appointments you have upcoming with your regular doctor.    Please go to the ER for any further episodes of chest pain, passing out, shortness of breath, or continuous palpitations.

## 2024-11-01 NOTE — ED PROVIDER NOTES
Time reflects when diagnosis was documented in both MDM as applicable and the Disposition within this note       Time User Action Codes Description Comment    11/1/2024  4:44 PM RitJavi singh Rell Add [R07.2] Retrosternal chest pain     11/1/2024  4:44 PM RitJada singhry Rell Add [E83.42] Hypomagnesemia     11/1/2024  4:46 PM RitJada singhry Rell Add [I95.9] Hypotension, unspecified hypotension type           ED Disposition       ED Disposition   Discharge    Condition   Stable    Date/Time   Fri Nov 1, 2024  4:44 PM    Comment   Tori Rossi discharge to home/self care.                   Assessment & Plan       Medical Decision Making  DDx includes but is not limited to ACS/PE/dissection/ptx/pna; relatively low concern for dissection given nature of sx and I do not suspect this diagnosis to be likely. Will assess ACS with ecg/troponin.  No history of ACS but has multiple ACS risk factors (hypertension/anemia/diabetes). Ptx/pna will be assessed by chest imaging. PE to be assessed with d-dimer. Orthostatic BP. Check electrolytes/renal function. BNP to assess for heart failure. She noted a significant (approx 54 kg) weight loss following use of Mounjaro: Was seen by primary physician for same on 23 October and had low BPs attributed to this.  Losartan was discontinued at that point.  She still remains intermittently lightheaded with orthostatic type symptoms.    No evidence of ACS, PE, ptx, pneumonia, heart failure, volume overload; no ECG changes c/w pericarditis. No elevation in troponin or ECG changes to suggest myocarditis. Pain is not strictly related to PO intake--GI source possible but not certain. Not reproducible so unlikely to be from simple chest wall source.  Has taken PPI previously consistently for acid reflux but not presently.  Does take it intermittently however as needed for epigastric discomfort/stomach upset. Advised that she take it consistently for a period of 1-2 weeks to see if that affects  symptoms at all.  Did demonstrate low BP in the ED which is not strictly attributable to hypovolemia as patient appears basically euvolemic/well-hydrated on exam.  Suspect that she is regulating around different BP set point compared to what she was accustomed to previously prior to significant weight loss.  Would expect requilibration around new set point especially given that she has discontinued the antihypertensive medication.  In the meantime, if she continues to have symptomatic hypotension/orthostasis, could increase salt intake (either from diet or salt tablets); discussed as much with patient.  ED return in the meantime for any further chest pain such as she had today.  All questions answered to satisfaction prior to discharge.  Patient expressed understanding and agreed to plan.        Amount and/or Complexity of Data Reviewed  Labs: ordered. Decision-making details documented in ED Course.  Radiology: ordered and independent interpretation performed.    Risk  Prescription drug management.        ED Course as of 11/01/24 2204 Fri Nov 01, 2024   1253 ECG NSR 77 bpm   QRS 92 QTc 418  No acute st/t changes  No q waves  Normal axis  No ectopy  Interpreted by me   1312 CBC and differential   1335 D-dimer, quantitative   1335 Basic metabolic panel   1335 HS Troponin 0hr (reflex protocol)   1335 Magnesium(!)   1335 Nonischemic initial troponin.  Will require repeat as patient is not low risk by heart score.  Replete magnesium intravenously.  Orthostatic vital signs.   1355 B-Type Natriuretic Peptide(BNP)  Normal   1405 hCG, qualitative pregnancy  Negative   1441 Definite orthostatic BP changes present upon standing.  Will provide additional IV fluid bolus.   1538 HS Troponin I 2hr  Delta of 0 not consistent with ACS       Medications   multi-electrolyte (ISOLYTE-S PH 7.4) bolus 1,000 mL (0 mL Intravenous Stopped 11/1/24 1411)   magnesium sulfate 2 g/50 mL IVPB (premix) 2 g (0 g Intravenous Stopped 11/1/24  1508)   multi-electrolyte (ISOLYTE-S PH 7.4) bolus 1,000 mL (0 mL Intravenous Stopped 24 1608)       ED Risk Strat Scores   HEART Risk Score      Flowsheet Row Most Recent Value   Heart Score Risk Calculator    History 1 Filed at: 2024 1317   ECG 0 Filed at: 2024 1317   Age 0 Filed at: 2024 1317   Risk Factors 2 Filed at: 2024 1317   Troponin 0 Filed at: 2024 1317   HEART Score 3 Filed at: 2024 1317                               SBIRT 20yo+      Flowsheet Row Most Recent Value   Initial Alcohol Screen: US AUDIT-C     1. How often do you have a drink containing alcohol? 0 Filed at: 2024 1242   2. How many drinks containing alcohol do you have on a typical day you are drinking?  0 Filed at: 2024 1242   3b. FEMALE Any Age, or MALE 65+: How often do you have 4 or more drinks on one occassion? 0 Filed at: 2024 1242   Audit-C Score 0 Filed at: 2024 1242   GOMEZ: How many times in the past year have you...    Used an illegal drug or used a prescription medication for non-medical reasons? Never Filed at: 2024 1242                            History of Present Illness       Chief Complaint   Patient presents with    Chest Pain     Chest pain that started this morning states feels like a squeezing sensation in her chest, denies any shortness of breath. Has been feeling dizzy for the past 2 weeks        Past Medical History:   Diagnosis Date    Diabetes mellitus (HCC)     Disease of thyroid gland     Hypercholesteremia     Hypertension     Stroke (HCC)       Past Surgical History:   Procedure Laterality Date     SECTION      CHOLECYSTECTOMY      DILATION AND CURETTAGE OF UTERUS      GASTRIC BYPASS      TUBAL LIGATION        History reviewed. No pertinent family history.   Social History     Tobacco Use    Smoking status: Never    Smokeless tobacco: Never   Vaping Use    Vaping status: Never Used   Substance Use Topics    Alcohol use: Never    Drug  use: Never      E-Cigarette/Vaping    E-Cigarette Use Never User       E-Cigarette/Vaping Substances      I have reviewed and agree with the history as documented.     This is a 44-year-old woman with the noted past medical hx who presents to the ED having developed retrosternal chest tightness upon awakening at 0600 this morning, with pain occurring persistently since then.  Was mildly diaphoretic at onset of pain although not so now.  No dyspnea/nausea/vomiting/syncope/palpitations since pain onset. Pain localized in the retrosternal region without radiation; no worsening with movement or physical activity. She is lightheaded since the onset of pain, having been lightheaded for approximately the past 2 weeks with symptoms worsening upon standing and ambulating.  She also notes that she is more dyspneic with ambulation over the past several days including climbing a flight of stairs which is unusual for her.  Recently had losartan discontinued due to low blood pressure.  Had previously been advised to eat a higher salt diet due to recurrently low BPs but still had symptomatically low blood pressures occasionally in the 80s/50s.  BP noted today slightly higher than what she has had over the past several weeks, although she still reports feeling lightheaded with the current pressure.  No history of ACS.  No history of VTE and no travel/surgery/immobilization in past 30 days.  No history of congestive heart failure.  Also has diffuse dull/throbbing headache consistent with migraines and not change from her typical symptoms.      History provided by:  Patient and medical records  Chest Pain  Associated symptoms: diaphoresis, dizziness and headache    Associated symptoms: no abdominal pain, no cough, no fatigue, no fever, no nausea, no palpitations, no shortness of breath, not vomiting and no weakness        Review of Systems   Constitutional:  Positive for diaphoresis. Negative for appetite change, fatigue and fever.    Respiratory:  Positive for chest tightness. Negative for cough and shortness of breath.    Cardiovascular:  Positive for chest pain. Negative for palpitations and leg swelling.   Gastrointestinal:  Negative for abdominal pain, nausea and vomiting.   Musculoskeletal: Negative.    Skin: Negative.    Neurological:  Positive for dizziness, light-headedness and headaches. Negative for syncope, facial asymmetry, speech difficulty and weakness.           Objective       ED Triage Vitals   Temperature Pulse Blood Pressure Respirations SpO2 Patient Position - Orthostatic VS   11/01/24 1241 11/01/24 1242 11/01/24 1242 11/01/24 1242 11/01/24 1242 11/01/24 1242   97.6 °F (36.4 °C) 80 105/64 18 98 % Sitting      Temp Source Heart Rate Source BP Location FiO2 (%) Pain Score    11/01/24 1241 11/01/24 1242 11/01/24 1242 -- 11/01/24 1242    Temporal Monitor Right arm  10 - Worst Possible Pain      Vitals      Date and Time Temp Pulse SpO2 Resp BP Pain Score FACES Pain Rating User   11/01/24 1615 -- -- -- 19 -- -- -- LD   11/01/24 1605 -- 71 100 % -- 117/75 -- -- LD   11/01/24 1423 -- 70 -- 24 95/58 -- -- AK   11/01/24 1422 -- 68 -- 15 107/71 -- -- AK   11/01/24 1421 -- 74 -- 11 103/68 -- -- AK   11/01/24 1401 -- -- -- 16 -- -- -- LD   11/01/24 1400 -- 72 97 % -- 102/69 -- -- LD   11/01/24 1306 -- -- -- 19 -- -- -- LD   11/01/24 1301 -- 75 98 % -- 106/64 -- -- LD   11/01/24 1242 -- 80 98 % 18 105/64 10 - Worst Possible Pain -- KS   11/01/24 1241 97.6 °F (36.4 °C) -- -- -- -- -- -- KS            Physical Exam  Vitals and nursing note reviewed.   Constitutional:       General: She is awake. She is in acute distress (mild painful distress).      Appearance: Normal appearance. She is well-developed.   HENT:      Head: Normocephalic and atraumatic.      Right Ear: Hearing and external ear normal.      Left Ear: Hearing and external ear normal.   Neck:      Trachea: Trachea and phonation normal.   Cardiovascular:      Rate and Rhythm:  Normal rate and regular rhythm.      Pulses:           Radial pulses are 2+ on the right side and 2+ on the left side.        Dorsalis pedis pulses are 2+ on the right side and 2+ on the left side.        Posterior tibial pulses are 2+ on the right side and 2+ on the left side.      Heart sounds: Normal heart sounds, S1 normal and S2 normal. No murmur heard.     No friction rub. No gallop.   Pulmonary:      Effort: Pulmonary effort is normal. No respiratory distress.      Breath sounds: Normal breath sounds. No stridor. No decreased breath sounds, wheezing, rhonchi or rales.   Abdominal:      General: There is no distension.      Palpations: There is no mass.      Tenderness: There is no abdominal tenderness. There is no guarding or rebound.   Musculoskeletal:      Right lower leg: No edema.      Left lower leg: No edema.   Skin:     General: Skin is warm and dry.   Neurological:      Mental Status: She is alert and oriented to person, place, and time.      GCS: GCS eye subscore is 4. GCS verbal subscore is 5. GCS motor subscore is 6.      Cranial Nerves: No cranial nerve deficit.      Sensory: No sensory deficit.      Motor: No abnormal muscle tone.      Comments: PERRLA; EOMI. Sensation intact to light touch over face in V1-V3 distribution bilaterally. Facial expressions symmetric. Tongue/uvula midline. Shoulder shrug equal bilaterally. Strength 5/5 in UE/LE bilaterally. Sensation intact to light touch in UE/LE bilaterally.         Results Reviewed       Procedure Component Value Units Date/Time    HS Troponin I 2hr [[id number]] Collected: 11/01/24 1507    Lab Status: Final result Specimen: Blood from Arm, Left Updated: 11/01/24 1533     hs TnI 2hr <2 ng/L      Delta 2hr hsTnI --    hCG, qualitative pregnancy [[id number]]  (Normal) Collected: 11/01/24 1300    Lab Status: Final result Specimen: Blood from Arm, Left Updated: 11/01/24 1405     Preg, Serum Negative    B-Type Natriuretic Peptide(BNP) [[id number]]   (Normal) Collected: 11/01/24 1300    Lab Status: Final result Specimen: Blood from Arm, Left Updated: 11/01/24 1340     BNP 11 pg/mL     HS Troponin 0hr (reflex protocol) [[id number]]  (Normal) Collected: 11/01/24 1300    Lab Status: Final result Specimen: Blood from Arm, Left Updated: 11/01/24 1330     hs TnI 0hr <2 ng/L     Basic metabolic panel [[id number]] Collected: 11/01/24 1300    Lab Status: Final result Specimen: Blood from Arm, Left Updated: 11/01/24 1325     Sodium 140 mmol/L      Potassium 3.7 mmol/L      Chloride 108 mmol/L      CO2 24 mmol/L      ANION GAP 8 mmol/L      BUN 15 mg/dL      Creatinine 0.84 mg/dL      Glucose 114 mg/dL      Calcium 8.8 mg/dL      eGFR 85 ml/min/1.73sq m     Narrative:      [employer] guidelines for Chronic Kidney Disease (CKD):     Stage 1 with normal or high GFR (GFR > 90 mL/min/1.73 square meters)    Stage 2 Mild CKD (GFR = 60-89 mL/min/1.73 square meters)    Stage 3A Moderate CKD (GFR = 45-59 mL/min/1.73 square meters)    Stage 3B Moderate CKD (GFR = 30-44 mL/min/1.73 square meters)    Stage 4 Severe CKD (GFR = 15-29 mL/min/1.73 square meters)    Stage 5 End Stage CKD (GFR <15 mL/min/1.73 square meters)  Note: GFR calculation is accurate only with a steady state creatinine    Magnesium [[id number]]  (Abnormal) Collected: 11/01/24 1300    Lab Status: Final result Specimen: Blood from Arm, Left Updated: 11/01/24 1325     Magnesium 1.8 mg/dL     D-dimer, quantitative [[id number]]  (Normal) Collected: 11/01/24 1300    Lab Status: Final result Specimen: Blood from Arm, Left Updated: 11/01/24 1320     D-Dimer, Quant 0.34 ug/ml FEU     CBC and differential [[id number]] Collected: 11/01/24 1300    Lab Status: Final result Specimen: Blood from Arm, Left Updated: 11/01/24 1311     WBC 7.42 Thousand/uL      RBC 4.20 Million/uL      Hemoglobin 12.1 g/dL      Hematocrit 38.0 %      MCV 91 fL      MCH 28.8 pg      MCHC 31.8 g/dL      RDW 13.0 %      MPV 10.4 fL       Platelets 356 Thousands/uL      nRBC 0 /100 WBCs      Segmented % 51 %      Immature Grans % 0 %      Lymphocytes % 42 %      Monocytes % 5 %      Eosinophils Relative 1 %      Basophils Relative 1 %      Absolute Neutrophils 3.74 Thousands/µL      Absolute Immature Grans 0.02 Thousand/uL      Absolute Lymphocytes 3.13 Thousands/µL      Absolute Monocytes 0.39 Thousand/µL      Eosinophils Absolute 0.08 Thousand/µL      Basophils Absolute 0.06 Thousands/µL             X-ray chest 1 view portable   ED Interpretation by Javi Malik DO (11/01 1440)   Airway is midline. Lungs are clear bilaterally with no evidence of pulmonary vascular congestion/focal infiltrate/pleural effusion/pneumothorax. Cardiac and mediastinal silhouettes are within normal limits. Osseous structures appear normal.            Procedures    ED Medication and Procedure Management   Prior to Admission Medications   Prescriptions Last Dose Informant Patient Reported? Taking?   QUEtiapine (SEROquel) 200 mg tablet   Yes No   Sig: Take 250 mg by mouth daily   acetaminophen (TYLENOL) 500 mg tablet   No No   Sig: Take 1 tablet (500 mg total) by mouth every 6 (six) hours as needed for mild pain   atorvastatin (LIPITOR) 40 mg tablet   Yes No   Sig: Take 40 mg by mouth daily   buPROPion (WELLBUTRIN XL) 300 mg 24 hr tablet   Yes No   Sig: Take 300 mg by mouth daily   clonazePAM (KlonoPIN) 1 mg tablet   Yes No   Sig: Take 1 mg by mouth daily   clopidogrel (PLAVIX) 75 mg tablet   Yes No   Sig: Take 75 mg by mouth daily   gabapentin (NEURONTIN) 300 mg capsule   Yes No   Sig: Take 300 mg by mouth Three times a day   hydrOXYzine HCL (ATARAX) 50 mg tablet   Yes No   Sig: hydroxyzine HCl 50 mg tablet   TAKE 1 CAPSULE BY MOUTH EVERY DAY AT NIGHT AS NEEDED FOR INSOMNIA   levothyroxine 100 mcg tablet   Yes No   Sig: Take 100 mcg by mouth daily   lisinopril (ZESTRIL) 10 mg tablet   Yes No   Sig: Take 10 mg by mouth daily   metFORMIN (GLUCOPHAGE) 1000 MG tablet   Yes  No   Sig: Take 500 mg by mouth 2 (two) times a day   semaglutide, 1 mg/dose, (Ozempic, 1 MG/DOSE,) 4 mg/3 mL injection pen   Yes No   Sig: Inject 1 mg under the skin once a week      Facility-Administered Medications: None     Discharge Medication List as of 11/1/2024  4:47 PM        CONTINUE these medications which have NOT CHANGED    Details   acetaminophen (TYLENOL) 500 mg tablet Take 1 tablet (500 mg total) by mouth every 6 (six) hours as needed for mild pain, Starting Mon 9/18/2023, Normal      atorvastatin (LIPITOR) 40 mg tablet Take 40 mg by mouth daily, Historical Med      buPROPion (WELLBUTRIN XL) 300 mg 24 hr tablet Take 300 mg by mouth daily, Starting Mon 4/10/2023, Historical Med      clonazePAM (KlonoPIN) 1 mg tablet Take 1 mg by mouth daily, Starting Tue 5/30/2023, Historical Med      clopidogrel (PLAVIX) 75 mg tablet Take 75 mg by mouth daily, Starting Wed 4/12/2023, Historical Med      gabapentin (NEURONTIN) 300 mg capsule Take 300 mg by mouth Three times a day, Starting Thu 4/13/2023, Historical Med      hydrOXYzine HCL (ATARAX) 50 mg tablet hydroxyzine HCl 50 mg tablet   TAKE 1 CAPSULE BY MOUTH EVERY DAY AT NIGHT AS NEEDED FOR INSOMNIA, Historical Med      levothyroxine 100 mcg tablet Take 100 mcg by mouth daily, Starting Tue 3/21/2023, Historical Med      lisinopril (ZESTRIL) 10 mg tablet Take 10 mg by mouth daily, Starting Wed 4/12/2023, Historical Med      metFORMIN (GLUCOPHAGE) 1000 MG tablet Take 500 mg by mouth 2 (two) times a day, Starting Thu 4/13/2023, Historical Med      QUEtiapine (SEROquel) 200 mg tablet Take 250 mg by mouth daily, Starting Mon 4/3/2023, Historical Med      semaglutide, 1 mg/dose, (Ozempic, 1 MG/DOSE,) 4 mg/3 mL injection pen Inject 1 mg under the skin once a week, Starting Thu 5/11/2023, Historical Med           No discharge procedures on file.  ED SEPSIS DOCUMENTATION   Time reflects when diagnosis was documented in both MDM as applicable and the Disposition within  this note       Time User Action Codes Description Comment    11/1/2024  4:44 PM Javi Malik Add [R07.2] Retrosternal chest pain     11/1/2024  4:44 PM Javi Malik Add [E83.42] Hypomagnesemia     11/1/2024  4:46 PM Javi Malik Add [I95.9] Hypotension, unspecified hypotension type                  Javi Malik DO  11/01/24 1205

## 2024-11-04 LAB
ATRIAL RATE: 77 BPM
P AXIS: 76 DEGREES
PR INTERVAL: 168 MS
QRS AXIS: 49 DEGREES
QRSD INTERVAL: 92 MS
QT INTERVAL: 370 MS
QTC INTERVAL: 418 MS
T WAVE AXIS: 64 DEGREES
VENTRICULAR RATE: 77 BPM

## 2024-11-04 PROCEDURE — 93010 ELECTROCARDIOGRAM REPORT: CPT | Performed by: INTERNAL MEDICINE

## 2025-01-08 ENCOUNTER — TELEPHONE (OUTPATIENT)
Dept: FAMILY MEDICINE CLINIC | Facility: CLINIC | Age: 45
End: 2025-01-08

## 2025-01-08 NOTE — TELEPHONE ENCOUNTER
Patient called to be added to dental wait list. She has [employer] insurance. She has no dental concerns, just looking for regular dental check up/cleaning. She will also call around in the meantime to other dental providers thanks!